# Patient Record
Sex: MALE | URBAN - METROPOLITAN AREA
[De-identification: names, ages, dates, MRNs, and addresses within clinical notes are randomized per-mention and may not be internally consistent; named-entity substitution may affect disease eponyms.]

---

## 2024-10-30 ENCOUNTER — TELEPHONE (OUTPATIENT)
Age: 29
End: 2024-10-30

## 2024-10-30 NOTE — TELEPHONE ENCOUNTER
Caller: Blu Millard    Doctor and/or Office: [facility]#: [phone]    Escalation: Appointment Patient would like a return call to discuss the process of orthotics before scheduling an appt. Thank you

## 2024-12-03 ENCOUNTER — OFFICE VISIT (OUTPATIENT)
Dept: PODIATRY | Facility: CLINIC | Age: 29
End: 2024-12-03
Payer: COMMERCIAL

## 2024-12-03 VITALS
WEIGHT: 156.6 LBS | BODY MASS INDEX: 23.73 KG/M2 | SYSTOLIC BLOOD PRESSURE: 113 MMHG | DIASTOLIC BLOOD PRESSURE: 71 MMHG | HEIGHT: 68 IN | HEART RATE: 86 BPM

## 2024-12-03 DIAGNOSIS — M21.41 PES PLANUS OF BOTH FEET: ICD-10-CM

## 2024-12-03 DIAGNOSIS — M76.822 POSTERIOR TIBIAL TENDON DYSFUNCTION (PTTD) OF BOTH LOWER EXTREMITIES: Primary | ICD-10-CM

## 2024-12-03 DIAGNOSIS — M21.42 PES PLANUS OF BOTH FEET: ICD-10-CM

## 2024-12-03 DIAGNOSIS — M76.821 POSTERIOR TIBIAL TENDON DYSFUNCTION (PTTD) OF BOTH LOWER EXTREMITIES: Primary | ICD-10-CM

## 2024-12-03 PROCEDURE — 99203 OFFICE O/P NEW LOW 30 MIN: CPT | Performed by: PODIATRIST

## 2024-12-03 NOTE — PROGRESS NOTES
PATIENT:  Blu Millard  1995         ASSESSMENT:     1. Posterior tibial tendon dysfunction (PTTD) of both lower extremities  Device prior authorization      2. Pes planus of both feet  Device prior authorization                PLAN:  1. Reviewed medical records.  Patient was counseled and educated on the condition and the diagnosis.    2. He has significant pes planus, left worse than right.  He also has mild PTTD.  The diagnosis, treatment options and prognosis were discussed with the patient.    3. Recommended custom orthotics.  Will call the insurance for possible coverage.  4. Instructed supportive care, home exercise, and proper footwear/ arch support.   5. Patient will return for orthotic casting.    Imaging: I have personally reviewed pertinent films in PACS  Labs, pathology, and Other Studies: I have personally reviewed pertinent reports.        Subjective:       HPI  The patient presents with chief complaint of flatfoot.  He has chronic flatfoot since he was little.  He has been wearing custom orthotics over the course.  His current orthotics do not seem to help much.  He had better orthotics when he was casted with plasters.  No acute pedal disorder or injury.  No swelling.  No associated numbness or paresthesia.        The following portions of the patient's history were reviewed and updated as appropriate: allergies, current medications, past family history, past medical history, past social history, past surgical history and problem list.  All pertinent labs and images were reviewed.      Past Medical History  History reviewed. No pertinent past medical history.    Past Surgical History  History reviewed. No pertinent surgical history.     Allergies:  Patient has no known allergies.    Medications:  No current outpatient medications on file.     No current facility-administered medications for this visit.       Social History:  Social History     Socioeconomic History    Marital  "status: Unknown     Spouse name: None    Number of children: None    Years of education: None    Highest education level: None   Occupational History    None   Tobacco Use    Smoking status: Never    Smokeless tobacco: Never   Substance and Sexual Activity    Alcohol use: Not Currently    Drug use: Never    Sexual activity: Yes   Other Topics Concern    None   Social History Narrative    None     Social Drivers of Health     Financial Resource Strain: Not on file   Food Insecurity: Not on file   Transportation Needs: Not on file   Physical Activity: Not on file   Stress: Not on file   Social Connections: Not on file   Intimate Partner Violence: Not on file   Housing Stability: Not on file          Review of Systems   Constitutional:  Negative for chills and fever.   Respiratory:  Negative for cough and shortness of breath.    Cardiovascular:  Negative for chest pain and leg swelling.   Gastrointestinal:  Negative for nausea and vomiting.   Musculoskeletal:  Negative for gait problem.   Skin:  Negative for wound.   Allergic/Immunologic: Negative for immunocompromised state.   Neurological:  Negative for weakness and numbness.   Hematological: Negative.    Psychiatric/Behavioral:  Negative for behavioral problems and confusion.          Objective:      /71 (BP Location: Right arm, Patient Position: Sitting, Cuff Size: Adult)   Pulse 86   Ht 5' 8\" (1.727 m) Comment: verbal  Wt 71 kg (156 lb 9.6 oz)   BMI 23.81 kg/m²          Physical Exam  Vitals reviewed.   Constitutional:       General: He is not in acute distress.     Appearance: He is not toxic-appearing or diaphoretic.   HENT:      Head: Normocephalic and atraumatic.   Eyes:      Extraocular Movements: Extraocular movements intact.   Cardiovascular:      Rate and Rhythm: Normal rate and regular rhythm.      Pulses: Normal pulses.           Dorsalis pedis pulses are 2+ on the right side and 2+ on the left side.        Posterior tibial pulses are 2+ on the " right side and 2+ on the left side.   Pulmonary:      Effort: Pulmonary effort is normal. No respiratory distress.   Musculoskeletal:         General: Deformity present. No signs of injury.      Cervical back: Normal range of motion and neck supple.      Right lower leg: No edema.      Left lower leg: No edema.      Right foot: No foot drop.      Left foot: No foot drop.      Comments: Flexible pes planus, left worse than right.  Significant arch collapse in left foot on stance.  Everted calcaneus on stance.  No focal pain or swelling.  Weak single heel rise, left worse than right.      Skin:     General: Skin is warm.      Capillary Refill: Capillary refill takes less than 2 seconds.      Coloration: Skin is not cyanotic or mottled.      Findings: No abscess or wound.      Nails: There is no clubbing.   Neurological:      General: No focal deficit present.      Mental Status: He is alert and oriented to person, place, and time.      Cranial Nerves: No cranial nerve deficit.      Sensory: No sensory deficit.      Coordination: Coordination normal.   Psychiatric:         Mood and Affect: Mood normal.         Behavior: Behavior normal.         Thought Content: Thought content normal.         Judgment: Judgment normal.

## 2024-12-17 ENCOUNTER — OFFICE VISIT (OUTPATIENT)
Dept: PODIATRY | Facility: CLINIC | Age: 29
End: 2024-12-17

## 2024-12-17 DIAGNOSIS — M21.41 PES PLANUS OF BOTH FEET: ICD-10-CM

## 2024-12-17 DIAGNOSIS — M76.822 POSTERIOR TIBIAL TENDON DYSFUNCTION (PTTD) OF BOTH LOWER EXTREMITIES: Primary | ICD-10-CM

## 2024-12-17 DIAGNOSIS — M21.42 PES PLANUS OF BOTH FEET: ICD-10-CM

## 2024-12-17 DIAGNOSIS — M76.821 POSTERIOR TIBIAL TENDON DYSFUNCTION (PTTD) OF BOTH LOWER EXTREMITIES: Primary | ICD-10-CM

## 2024-12-17 PROCEDURE — PBNCHG PB NO CHARGE PLACEHOLDER: Performed by: PODIATRIST

## 2025-01-14 ENCOUNTER — OFFICE VISIT (OUTPATIENT)
Dept: PODIATRY | Facility: CLINIC | Age: 30
End: 2025-01-14
Payer: COMMERCIAL

## 2025-01-14 DIAGNOSIS — M76.821 POSTERIOR TIBIAL TENDON DYSFUNCTION (PTTD) OF BOTH LOWER EXTREMITIES: Primary | ICD-10-CM

## 2025-01-14 DIAGNOSIS — M21.41 PES PLANUS OF BOTH FEET: ICD-10-CM

## 2025-01-14 DIAGNOSIS — M76.822 POSTERIOR TIBIAL TENDON DYSFUNCTION (PTTD) OF BOTH LOWER EXTREMITIES: Primary | ICD-10-CM

## 2025-01-14 DIAGNOSIS — M21.42 PES PLANUS OF BOTH FEET: ICD-10-CM

## 2025-01-14 PROCEDURE — L3000 FT INSERT UCB BERKELEY SHELL: HCPCS | Performed by: PODIATRIST

## 2025-03-28 ENCOUNTER — TELEPHONE (OUTPATIENT)
Age: 30
End: 2025-03-28

## 2025-03-28 NOTE — TELEPHONE ENCOUNTER
Caller: Sara/[employer]/ADDY    Doctor: Dr. Ish Luis / [facility]    Reason for call: Blu came to the office and picked up his orthotics.  BC needs to know if the office submits the claim when the orthotics are casted or when the patient picks them up.  Can someone please reach out to Sara?  Thank you.     Call back#: [phone]

## 2025-04-29 ENCOUNTER — OFFICE VISIT (OUTPATIENT)
Dept: PODIATRY | Facility: CLINIC | Age: 30
End: 2025-04-29
Payer: COMMERCIAL

## 2025-04-29 VITALS — BODY MASS INDEX: 24.71 KG/M2 | HEIGHT: 68 IN | WEIGHT: 163 LBS

## 2025-04-29 DIAGNOSIS — M21.41 PES PLANUS OF BOTH FEET: ICD-10-CM

## 2025-04-29 DIAGNOSIS — M76.822 POSTERIOR TIBIAL TENDON DYSFUNCTION (PTTD) OF BOTH LOWER EXTREMITIES: Primary | ICD-10-CM

## 2025-04-29 DIAGNOSIS — M76.821 POSTERIOR TIBIAL TENDON DYSFUNCTION (PTTD) OF BOTH LOWER EXTREMITIES: Primary | ICD-10-CM

## 2025-04-29 DIAGNOSIS — M21.42 PES PLANUS OF BOTH FEET: ICD-10-CM

## 2025-04-29 PROCEDURE — 99213 OFFICE O/P EST LOW 20 MIN: CPT | Performed by: PODIATRIST

## 2025-04-29 NOTE — PROGRESS NOTES
PATIENT:  Blu Millard  1995         ASSESSMENT:     1. Posterior tibial tendon dysfunction (PTTD) of both lower extremities        2. Pes planus of both feet                  PLAN:  1. Reviewed medical records.  Patient was counseled and educated on the condition and the diagnosis.    2. Discussed options and will send the orthotics for modification.  Will removed the heel skive and pad.    3. Explained there would be no guarantee that he would have complete comfort with the device especially with heavy activity.    4. Will call him after the modification.      Imaging: I have personally reviewed pertinent films in PACS  Labs, pathology, and Other Studies: I have personally reviewed pertinent reports.        Subjective:       HPI  The patient presents for follow-up.  He has new orthotics in January.  He is doing well with regular activity.  But, he feels some strain of legs after strenuous activity such as long hiking.  No swelling.  No associated numbness or paresthesia.      The following portions of the patient's history were reviewed and updated as appropriate: allergies, current medications, past family history, past medical history, past social history, past surgical history and problem list.  All pertinent labs and images were reviewed.      Past Medical History  History reviewed. No pertinent past medical history.    Past Surgical History  History reviewed. No pertinent surgical history.     Allergies:  Patient has no known allergies.    Medications:  No current outpatient medications on file.     No current facility-administered medications for this visit.       Social History:  Social History     Socioeconomic History    Marital status: Unknown     Spouse name: None    Number of children: None    Years of education: None    Highest education level: None   Occupational History    None   Tobacco Use    Smoking status: Never    Smokeless tobacco: Never   Substance and Sexual Activity     "Alcohol use: Not Currently    Drug use: Never    Sexual activity: Yes   Other Topics Concern    None   Social History Narrative    None     Social Drivers of Health     Financial Resource Strain: Not on file   Food Insecurity: Not on file   Transportation Needs: Not on file   Physical Activity: Not on file   Stress: Not on file   Social Connections: Not on file   Intimate Partner Violence: Not on file   Housing Stability: Not on file          Review of Systems   Constitutional:  Negative for chills and fever.   Respiratory:  Negative for cough and shortness of breath.    Cardiovascular:  Negative for chest pain.   Gastrointestinal:  Negative for nausea and vomiting.   Musculoskeletal:  Negative for gait problem.   Neurological:  Negative for weakness and numbness.         Objective:      Ht 5' 8\" (1.727 m)   Wt 73.9 kg (163 lb)   BMI 24.78 kg/m²          Physical Exam  Vitals reviewed.   Constitutional:       General: He is not in acute distress.     Appearance: He is not toxic-appearing or diaphoretic.   Cardiovascular:      Rate and Rhythm: Normal rate and regular rhythm.      Pulses: Normal pulses.           Dorsalis pedis pulses are 2+ on the right side and 2+ on the left side.        Posterior tibial pulses are 2+ on the right side and 2+ on the left side.   Pulmonary:      Effort: Pulmonary effort is normal. No respiratory distress.   Musculoskeletal:         General: Deformity present. No signs of injury.      Right lower leg: No edema.      Left lower leg: No edema.      Right foot: No foot drop.      Left foot: No foot drop.      Comments: Semi-flexible pes planus, left worse than right.  Everted calcaneus on stance.  It is corrected when standing on orthotics.  No focal pain or swelling.     Skin:     General: Skin is warm.      Capillary Refill: Capillary refill takes less than 2 seconds.      Coloration: Skin is not cyanotic or mottled.      Findings: No abscess or wound.      Nails: There is no " clubbing.   Neurological:      General: No focal deficit present.      Mental Status: He is alert and oriented to person, place, and time.      Cranial Nerves: No cranial nerve deficit.      Sensory: No sensory deficit.      Coordination: Coordination normal.   Psychiatric:         Mood and Affect: Mood normal.         Behavior: Behavior normal.         Thought Content: Thought content normal.         Judgment: Judgment normal.

## 2025-04-30 ENCOUNTER — TELEPHONE (OUTPATIENT)
Dept: OBGYN CLINIC | Facility: CLINIC | Age: 30
End: 2025-04-30

## 2025-04-30 NOTE — TELEPHONE ENCOUNTER
I ----- Message from Shelbi HEART sent at 4/29/2025  5:09 PM EDT -----  Regarding: orthotic  Patient was in for visit about his orthotics and discussing about removing heel lift with Dr Luis.    Patient was back and forth with should he have this done or not because it helps a little but not a 100%.        Wanted to know if a new pair could be made with out heel and explained that insurance usually pays for one a year if they allow it..   He would be responsible for cost of another pair.      Could you please call patient at [phone]      I did give the orthotics back to patient to wear so he wasn't with out anything.           Thank you      Shelbi Alonso

## 2025-04-30 NOTE — TELEPHONE ENCOUNTER
I returned patient call and discussed his orthotics with him. I will touch base with Dr. Luis on what he wants repaired on the orthotics. Patient also wants to repair an old pair of orthotics he has. I mailed out a form with [facility] PT department that handle orthotics to the patient. Patient is moving by July 1 and would like to have his orthotics fixed before he leaves. I will call [facility] and check if his current pair is under a warranty.

## 2025-05-02 NOTE — TELEPHONE ENCOUNTER
Caller: Blu    Doctor: Dr. Luis    Reason for call: Was expecting a call back from the Surgical Coordinator by 12 today? Was just reaching back out     Call back#: [phone]

## 2025-05-06 ENCOUNTER — TELEPHONE (OUTPATIENT)
Dept: OBGYN CLINIC | Facility: CLINIC | Age: 30
End: 2025-05-06

## 2025-05-06 NOTE — TELEPHONE ENCOUNTER
After speaking with patient he is asking to send his orthotics from [facility] back to [facility] to be repaired. Patient wants his orthotics from [facility] to be repaired to match [facility]. I spoke to Ana at [facility] she did advise they would need to have both pairs of orthotics in front of them to determine if they can be repaired. Patient wants the heel cushions that were added to his orthotics taken out and to match his older pair. Per Ana she said it will be a regular repair. Patient is aware he would need to pay $100.00 for the repair. Patient dropped off the orthotics and wants a met pad added to the [facility] pair of orthotics. I explained to the patient I will send both pair of orthotics to [facility] but adding a met pad might change the cost. I am not sure if they would need to make a new pair of orthotics to accommodate his repair request. I will add a note to [facility] asking if more work needs to be done to his orthotic since we are adding a met pad and if that would consider to be a new pair of orthotics to reach out to me so I can inform the patient. Patient also questioned if the insurance would cover a new pair. I informed patient he can call his insurance and ask if they would cover a new pair if we needed to make a new pair of orthotics. I took both pair of orthotics and will contact patient once [facility] receives them.

## 2025-05-14 ENCOUNTER — TELEPHONE (OUTPATIENT)
Dept: PODIATRY | Facility: CLINIC | Age: 30
End: 2025-05-14

## 2025-05-14 NOTE — TELEPHONE ENCOUNTER
Caller: Jerome Casas/Carolyne    Doctor: Dr. Ish Luis    Reason for call: They received an orthotic order with a message to contact Shelbi.  Please return Carolyne's call.    Call back#: [phone] - Anyone on the team can help you

## 2025-05-15 NOTE — TELEPHONE ENCOUNTER
Carolyne returned my call and she did explain the orthotics that were sent for patient can not be repaired since they did not do the original pair. I asked Carolyne to hold on to the orthotics until I speak to the patient and explain to him. I will speak to patient Monday since I am out of the office on Friday. Then I will call Carolyne and let her know what we are doing.

## 2025-05-21 ENCOUNTER — TELEPHONE (OUTPATIENT)
Age: 30
End: 2025-05-21

## 2025-05-21 NOTE — TELEPHONE ENCOUNTER
Caller: Blu Millard    Doctor: Dr. Luis    Reason for call: orthotic issues/called office and Shelbi will handle call    Call back#: NA

## 2025-05-21 NOTE — TELEPHONE ENCOUNTER
Spoke to Yvette at [facility] since Carolyne was not in the office today. Yvette will mail back to us right away both pairs of orthotics.

## 2025-05-21 NOTE — TELEPHONE ENCOUNTER
I spoke with Blu and explained to him [facility] can not fix his orthotics to match his old pair. I did suggest he call the company where he got the older orthotics from. I will call Carolyne from [facility] and have her return both pairs of the orthotics to me. Once the orthotics come in I will call patient and he will pick them up.

## 2025-05-23 ENCOUNTER — EVALUATION (OUTPATIENT)
Dept: PHYSICAL THERAPY | Age: 30
End: 2025-05-23
Attending: PHYSICAL THERAPIST
Payer: COMMERCIAL

## 2025-05-23 DIAGNOSIS — M21.42 PES PLANUS OF BOTH FEET: Primary | ICD-10-CM

## 2025-05-23 DIAGNOSIS — M21.41 PES PLANUS OF BOTH FEET: Primary | ICD-10-CM

## 2025-05-23 PROCEDURE — 97760 ORTHOTIC MGMT&TRAING 1ST ENC: CPT | Performed by: PHYSICAL THERAPIST

## 2025-05-24 NOTE — PROGRESS NOTES
Orthotic Evaluation    Today's date: 25  Patient name: Blu Millard  : 1995  MRN: [id number]  Referring provider: Neeraj Bates PT  Dx:   Encounter Diagnosis     ICD-10-CM    1. Pes planus of both feet  M21.41     M21.42                       Subjective:    Blu presents today for orthotic evaluation. he complains of pain with functional activities including ambulation, athletics, and work. The patient plans to use his orthotic for athletics, walking, standing, and running. The orthotic will be placed in a standard, size 10 sneaker.    Objective:    Age: 29 y.o.  Weight: 163    Foot Posture Index Score    Right Foot  Talar dome - +1  Talonavicular bulge - +1  Medial longitudinal arch - +2  Malleolar curve - +1   Calcaneal eversion - +1  Too many toes - +1  Total Score R = +7    Left Foot  Talar dome - +1  Talonavicular bulge - +1  Medial longitudinal arch - +2  Malleolar curve - +1   Calcaneal eversion - +1  Too many toes - +1  Total Score L = +7    Reference Values  Normal =    0 to +5  Pronated =  +6 to +9 Highly Pronated =  10+  Supinated =   -1 to -4 Highly Supinated = -5 to -12    Objective    Assessment:    Patient requires custom foot orthosis with deep heel cup, arch support and varus posting to correct altered gait mechanics. Patient is not currently controlled by his current shoe wear. Patient was educated on the design of the custom orthotic and it's ability to offload his current pathology. Patient was also educated on potential shoe wear changes with device, break-in period, and skin checks to avoid potential skin break down.     Orthotic goals:  1) Patient will have custom foot orthoses fitted to his shoe.   2) Patient will be compliant with break-in period of custom foot orthoses as prescribed by PT.   3) Patient will be compliant with custom foot orthoses use as prescribed by PT.     Plan:    Planned therapy interventions: orthotic fitting/training  Duration in visits: 2

## 2025-06-03 ENCOUNTER — OFFICE VISIT (OUTPATIENT)
Dept: PHYSICAL THERAPY | Age: 30
End: 2025-06-03
Attending: PHYSICAL THERAPIST
Payer: COMMERCIAL

## 2025-06-03 DIAGNOSIS — M21.41 PES PLANUS OF BOTH FEET: Primary | ICD-10-CM

## 2025-06-03 DIAGNOSIS — M21.42 PES PLANUS OF BOTH FEET: Primary | ICD-10-CM

## 2025-06-03 PROCEDURE — L3010 FOOT LONGITUDINAL ARCH SUPPO: HCPCS | Performed by: PHYSICAL THERAPIST

## 2025-06-03 NOTE — PROGRESS NOTES
Patient orthotics dispensed.  Excellent fit in current footwear achieved.  I am worried about skin breakdown at [facility] - patient to monitor.  Gait pattern is changed and improved.  Patient instructed and provided handout in regard to break in period.  Patient advised to contact provider with any issues or questions.